# Patient Record
Sex: MALE | Race: WHITE | NOT HISPANIC OR LATINO
[De-identification: names, ages, dates, MRNs, and addresses within clinical notes are randomized per-mention and may not be internally consistent; named-entity substitution may affect disease eponyms.]

---

## 2021-08-18 PROBLEM — Z00.00 ENCOUNTER FOR PREVENTIVE HEALTH EXAMINATION: Status: ACTIVE | Noted: 2021-08-18

## 2021-08-19 ENCOUNTER — APPOINTMENT (OUTPATIENT)
Dept: PULMONOLOGY | Facility: CLINIC | Age: 56
End: 2021-08-19
Payer: COMMERCIAL

## 2021-08-19 VITALS
SYSTOLIC BLOOD PRESSURE: 130 MMHG | WEIGHT: 315 LBS | HEIGHT: 69 IN | HEART RATE: 78 BPM | TEMPERATURE: 98.8 F | OXYGEN SATURATION: 93 % | BODY MASS INDEX: 46.65 KG/M2 | DIASTOLIC BLOOD PRESSURE: 84 MMHG

## 2021-08-19 DIAGNOSIS — M25.472 EFFUSION, RIGHT ANKLE: ICD-10-CM

## 2021-08-19 DIAGNOSIS — G47.19 OTHER HYPERSOMNIA: ICD-10-CM

## 2021-08-19 DIAGNOSIS — Z78.9 OTHER SPECIFIED HEALTH STATUS: ICD-10-CM

## 2021-08-19 DIAGNOSIS — G80.9 CEREBRAL PALSY, UNSPECIFIED: ICD-10-CM

## 2021-08-19 DIAGNOSIS — Z86.79 PERSONAL HISTORY OF OTHER DISEASES OF THE CIRCULATORY SYSTEM: ICD-10-CM

## 2021-08-19 DIAGNOSIS — M25.471 EFFUSION, RIGHT ANKLE: ICD-10-CM

## 2021-08-19 PROCEDURE — 99204 OFFICE O/P NEW MOD 45 MIN: CPT

## 2021-08-19 RX ORDER — RAMIPRIL 5 MG/1
CAPSULE ORAL
Refills: 0 | Status: ACTIVE | COMMUNITY

## 2021-08-19 RX ORDER — AMLODIPINE BESYLATE 5 MG/1
TABLET ORAL
Refills: 0 | Status: ACTIVE | COMMUNITY

## 2021-08-19 RX ORDER — FUROSEMIDE 80 MG/1
TABLET ORAL
Refills: 0 | Status: ACTIVE | COMMUNITY

## 2021-08-19 NOTE — ASSESSMENT
[FreeTextEntry1] : excessive daytime somnolence \par obstructive sleep apnea by hx\par edema\par \par Appears to have untreated obstructive sleep apnea causing excessive daytime somnolence.  Nocturnal hypoxemia may also be at least part of etiology of ankle edema.  The patient is advised that in addition to worsening sleep leading to daytime sleepiness, sleep apnea, at least when severe, may be associated with worsening hypertension and diabetes, and may be a risk factor for cardiovascular disease and stroke. \par \par Advised to not drive or engage in any other potentially hazardous activity while feeling drowsy or sleepy. \par \par Will have overnight polysomnography to assess and probably restart rx for obstructive sleep apnea. R/O R-CHF as etiology of edema.

## 2021-08-19 NOTE — HISTORY OF PRESENT ILLNESS
[FreeTextEntry1] : 08/19/2021 :  SUJATHA FAYE is a 56 year old male who is here for excessive daytime somnolence.  Hx obstructive sleep apnea dating back >10 yrs, unclear where he was first diagnosed.  Was on CPAP and doing well until machine started malfunctioning 6-12 months ago, no use now.  Usual bedtime 12 mn, latency 10 min, wakes "every hour" until out of bed at 5 am.  Severe snoring.  Reports severe excessive daytime somnolence with [place] sleepiness scale (out of 24 points) = 14, admits to becoming drowsy while driving. Denies morning headache, parasomnia, restless legs, leg movements, cataplexy or sleep paralysis. Has gained weight, 20 lbs past year. \par \par Treated for hypertension, chronic ankle edema, probably worse past few months.

## 2021-08-19 NOTE — CONSULT LETTER
[Dear  ___] : Dear  [unfilled], [Consult Letter:] : I had the pleasure of evaluating your patient, [unfilled]. [Please see my note below.] : Please see my note below. [Consult Closing:] : Thank you very much for allowing me to participate in the care of this patient.  If you have any questions, please do not hesitate to contact me. [Sincerely,] : Sincerely, [FreeTextEntry2] : Katarzyna Elizabeth MD\par Joy [FreeTextEntry3] : Yoel Ramos MD\par Director, [facility]\par [facility]\par [address]\[place] [address]\par [phone]

## 2021-08-19 NOTE — PHYSICAL EXAM
[General Appearance - Well Developed] : well developed [Normal Appearance] : normal appearance [Well Groomed] : well groomed [General Appearance - Well Nourished] : well nourished [No Deformities] : no deformities [General Appearance - In No Acute Distress] : no acute distress [Normal Conjunctiva] : the conjunctiva exhibited no abnormalities [Eyelids - No Xanthelasma] : the eyelids demonstrated no xanthelasmas [Low Lying Soft Palate] : low lying soft palate [Elongated Uvula] : no elongated uvula [Erythema] : no erythema of the pharynx [Retrognathia] : no retrognathia [III] : III [Neck Appearance] : the appearance of the neck was normal [Neck Cervical Mass (___cm)] : no neck mass was observed [Jugular Venous Distention Increased] : there was no jugular-venous distention [Thyroid Diffuse Enlargement] : the thyroid was not enlarged [Thyroid Nodule] : there were no palpable thyroid nodules [Heart Rate And Rhythm] : heart rate was normal and rhythm regular [Heart Sounds] : normal S1 and S2 [Heart Sounds Gallop] : no gallops [Murmurs] : no murmurs [Heart Sounds Pericardial Friction Rub] : no pericardial rub [Auscultation Breath Sounds / Voice Sounds] : lungs were clear to auscultation bilaterally [Abnormal Walk] : normal gait [Musculoskeletal - Swelling] : no joint swelling seen [Motor Tone] : muscle strength and tone were normal [Cyanosis, Localized] : no localized cyanosis [Nail Clubbing] : no clubbing of the fingernails [3+ Pitting] : 3+  pitting  [FreeTextEntry2] : bilateral ankle edema [Skin Color & Pigmentation] : normal skin color and pigmentation [Skin Turgor] : normal skin turgor [] : no rash

## 2021-09-22 ENCOUNTER — OUTPATIENT (OUTPATIENT)
Dept: OUTPATIENT SERVICES | Facility: HOSPITAL | Age: 56
LOS: 1 days | End: 2021-09-22
Payer: COMMERCIAL

## 2021-09-22 ENCOUNTER — APPOINTMENT (OUTPATIENT)
Dept: SLEEP CENTER | Facility: HOME HEALTH | Age: 56
End: 2021-09-22
Payer: COMMERCIAL

## 2021-09-22 PROCEDURE — 95800 SLP STDY UNATTENDED: CPT | Mod: 26

## 2021-09-22 PROCEDURE — 95800 SLP STDY UNATTENDED: CPT

## 2021-09-28 DIAGNOSIS — G47.33 OBSTRUCTIVE SLEEP APNEA (ADULT) (PEDIATRIC): ICD-10-CM

## 2021-10-06 ENCOUNTER — FORM ENCOUNTER (OUTPATIENT)
Age: 56
End: 2021-10-06

## 2021-10-06 ENCOUNTER — APPOINTMENT (OUTPATIENT)
Dept: PULMONOLOGY | Facility: CLINIC | Age: 56
End: 2021-10-06
Payer: COMMERCIAL

## 2021-10-06 VITALS
HEIGHT: 69 IN | HEART RATE: 88 BPM | RESPIRATION RATE: 12 BRPM | TEMPERATURE: 97.2 F | OXYGEN SATURATION: 95 % | DIASTOLIC BLOOD PRESSURE: 98 MMHG | BODY MASS INDEX: 46.65 KG/M2 | WEIGHT: 315 LBS | SYSTOLIC BLOOD PRESSURE: 153 MMHG

## 2021-10-06 DIAGNOSIS — G47.33 OBSTRUCTIVE SLEEP APNEA (ADULT) (PEDIATRIC): ICD-10-CM

## 2021-10-06 PROCEDURE — 99214 OFFICE O/P EST MOD 30 MIN: CPT

## 2021-10-06 NOTE — HISTORY OF PRESENT ILLNESS
[FreeTextEntry1] : 08/19/2021 :  SUJATHA FAYE is a 56 year old male who is here for excessive daytime somnolence.  Hx obstructive sleep apnea dating back >10 yrs, unclear where he was first diagnosed.  Was on CPAP and doing well until machine started malfunctioning 6-12 months ago, no use now.  Usual bedtime 12 mn, latency 10 min, wakes "every hour" until out of bed at 5 am.  Severe snoring.  Reports severe excessive daytime somnolence with [place] sleepiness scale (out of 24 points) = 14, admits to becoming drowsy while driving. Denies morning headache, parasomnia, restless legs, leg movements, cataplexy or sleep paralysis. Has gained weight, 20 lbs past year. \par \par Treated for hypertension, chronic ankle edema, probably worse past few months. \par \par 10/6/21:  reviewed recent unattended home sleep testing with patient:  9/22/21- very severe obstructive sleep apnea with frequent severe oxygen desaturations.  No significant medical history or changes in medication since last visit.

## 2021-10-06 NOTE — ASSESSMENT
[FreeTextEntry1] : severe obstructive sleep apnea \par \par Treatment options for sleep disordered breathing were discussed.  The most rapid and successful treatment remains nasal CPAP or BilevelPAP.  Alternatives include upper airway surgery such as uvulopharyngoplasty or a dental appliance (better for milder cases).  Recently hypoglossal nerve stimulation has been used.  Positional therapy (avoidance of supine posture) can be helpful, and all patients should try to maintain a healthy weight and avoid alcohol or other sedating medications close to bedtime \par \par The patient is advised that in addition to worsening sleep leading to daytime sleepiness, sleep apnea, at least when severe, may be associated with worsening hypertension and diabetes, and may be a risk factor for cardiovascular disease and stroke.\par \par Treatment will be ordered with autotitrating CPAP, which will be downloaded after a few weeks of use to check compliance and optimize pressure based on efficacy.  If not comfortable with this treatment, polysomnography with CPAP titration may be needed. Discussed choice of interface, cleaning, humidifier water, adjusting to CPAP, insurance rules for monitoring usage.\par \par Follow up after one month of use or earlier if any problems.\par

## 2021-10-06 NOTE — PHYSICAL EXAM
[General Appearance - Well Developed] : well developed [Normal Appearance] : normal appearance [Well Groomed] : well groomed [General Appearance - Well Nourished] : well nourished [No Deformities] : no deformities [General Appearance - In No Acute Distress] : no acute distress [Low Lying Soft Palate] : low lying soft palate [Elongated Uvula] : no elongated uvula [Erythema] : no erythema of the pharynx [Retrognathia] : no retrognathia [III] : III [] : no respiratory distress [Auscultation Breath Sounds / Voice Sounds] : lungs were clear to auscultation bilaterally [Nail Clubbing] : no clubbing of the fingernails [Cyanosis, Localized] : no localized cyanosis [3+ Pitting] : 3+  pitting  [FreeTextEntry2] : bilateral ankle edema- L>R